# Patient Record
Sex: MALE | Race: WHITE | HISPANIC OR LATINO | URBAN - METROPOLITAN AREA
[De-identification: names, ages, dates, MRNs, and addresses within clinical notes are randomized per-mention and may not be internally consistent; named-entity substitution may affect disease eponyms.]

---

## 2018-06-20 ENCOUNTER — OFFICE VISIT (OUTPATIENT)
Dept: URBAN - METROPOLITAN AREA CLINIC 11 | Facility: CLINIC | Age: 78
End: 2018-06-20
Payer: COMMERCIAL

## 2018-06-20 DIAGNOSIS — H40.1133 PRIMARY OPEN-ANGLE GLAUCOMA, BILATERAL, SEVERE STAGE: Primary | ICD-10-CM

## 2018-06-20 PROCEDURE — 99214 OFFICE O/P EST MOD 30 MIN: CPT | Performed by: OPHTHALMOLOGY

## 2018-06-20 ASSESSMENT — INTRAOCULAR PRESSURE
OD: 15
OS: 17

## 2018-06-20 NOTE — IMPRESSION/PLAN
Impression: Age-related nuclear cataract, bilateral: H25.13. Plan: Cataracts account for the patient's complaints. No treatment currently recommended. The patient will monitor vision changes and contact us with any decrease in vision.  refraction PRN with Dr. Latham Dire

## 2018-06-20 NOTE — IMPRESSION/PLAN
Impression: Primary open-angle glaucoma, bilateral, severe stage: T09.1546. /578, 11/17 OCT [age] 6/6, 12/17 HVF OU Shemi -poorly reli Plan: IOP improved. Goal IOP mid teens OU. Recommend pt continue Travatan OS QHS and Simbrinza OU TID. Will continue to monitor IOP.

## 2018-08-13 ENCOUNTER — TESTING ONLY (OUTPATIENT)
Dept: URBAN - METROPOLITAN AREA CLINIC 11 | Facility: CLINIC | Age: 78
End: 2018-08-13

## 2018-08-13 DIAGNOSIS — H52.4 PRESBYOPIA: Primary | ICD-10-CM

## 2018-08-13 PROCEDURE — 92012 INTRM OPH EXAM EST PATIENT: CPT | Performed by: OPTOMETRIST

## 2018-08-13 ASSESSMENT — VISUAL ACUITY
OD: 20/40
OS: 20/50

## 2018-08-13 ASSESSMENT — INTRAOCULAR PRESSURE
OD: 21
OS: 28

## 2019-09-06 ENCOUNTER — OFFICE VISIT (OUTPATIENT)
Dept: URBAN - METROPOLITAN AREA CLINIC 11 | Facility: CLINIC | Age: 79
End: 2019-09-06
Payer: COMMERCIAL

## 2019-09-06 PROCEDURE — 92012 INTRM OPH EXAM EST PATIENT: CPT | Performed by: OPTOMETRIST

## 2019-09-17 ENCOUNTER — OFFICE VISIT (OUTPATIENT)
Dept: URBAN - METROPOLITAN AREA CLINIC 11 | Facility: CLINIC | Age: 79
End: 2019-09-17
Payer: COMMERCIAL

## 2019-09-17 DIAGNOSIS — H25.813 COMBINED FORMS OF AGE-RELATED CATARACT, BILATERAL: Primary | ICD-10-CM

## 2019-09-17 PROCEDURE — 92002 INTRM OPH EXAM NEW PATIENT: CPT | Performed by: OPHTHALMOLOGY

## 2019-09-17 PROCEDURE — 92012 INTRM OPH EXAM EST PATIENT: CPT | Performed by: OPHTHALMOLOGY

## 2019-09-17 PROCEDURE — 92020 GONIOSCOPY: CPT | Performed by: OPHTHALMOLOGY

## 2019-09-17 PROCEDURE — 92133 CPTRZD OPH DX IMG PST SGM ON: CPT | Performed by: OPHTHALMOLOGY

## 2019-09-17 RX ORDER — BRINZOLAMIDE/BRIMONIDINE TARTRATE 10; 2 MG/ML; MG/ML
SUSPENSION/ DROPS OPHTHALMIC
Qty: 1 | Refills: 6 | Status: ACTIVE
Start: 2019-09-17

## 2019-09-17 RX ORDER — TRAVOPROST 0.04 MG/ML
0.004 % SOLUTION/ DROPS OPHTHALMIC
Qty: 1 | Refills: 9 | Status: ACTIVE
Start: 2019-09-17

## 2019-09-17 ASSESSMENT — VISUAL ACUITY
OS: 20/60
OD: 20/60

## 2019-09-17 ASSESSMENT — KERATOMETRY
OS: 45.38
OD: 45.63

## 2019-09-17 ASSESSMENT — INTRAOCULAR PRESSURE
OS: 16
OD: 15

## 2019-09-17 NOTE — IMPRESSION/PLAN
Impression: Combined forms of age-related cataract, bilateral: H25.813. Plan: OK for ce/iol OD then OS (Omni OU) in [address], RL2. Distance target. Recommend Pred/Nep/Moxi and Pred/Nep combined drops. Discussed diagnosis of cataracts. Cataracts are limiting vision. Discussed risks, benefits and alternatives to surgery including but not limited to: bleeding, infection, risk of vision loss, loss of the eye, need for other surgery. Patient voiced understanding and wishes to proceed.

## 2019-09-17 NOTE — IMPRESSION/PLAN
Impression: Primary open-angle glaucoma, bilateral, severe stage: S98.3161. /578, 9/19 OCT [age] 6/5, 12/17 HVF OU Shemi -poorly reli
IOP up OS > OD  - no drops for months to a year
narrow angles OU Plan: Discussed diagnosis with patient. Discussed treatment options with patient. Recommend patient continue  Travatan QHS OU, and Simbrinza TID OU (medication sent to pharmacy)  Recommend Omni OU (if unable due to insurance coverage, second choice iStent). Discussed glaucoma and cataracts. Discussed risks, benefits and alternatives to MIGS procedure. Pt voiced understanding and desires to proceed.

## 2019-11-07 ENCOUNTER — PRE-OPERATIVE VISIT (OUTPATIENT)
Dept: URBAN - METROPOLITAN AREA CLINIC 11 | Facility: CLINIC | Age: 79
End: 2019-11-07
Payer: COMMERCIAL

## 2019-11-07 DIAGNOSIS — H25.13 AGE-RELATED NUCLEAR CATARACT, BILATERAL: Primary | ICD-10-CM

## 2019-11-07 PROCEDURE — 92136 OPHTHALMIC BIOMETRY: CPT | Performed by: OPHTHALMOLOGY

## 2019-11-07 ASSESSMENT — PACHYMETRY
OS: 2.91
OD: 23.24
OD: 2.79
OS: 23.37

## 2019-11-13 ENCOUNTER — SURGERY (OUTPATIENT)
Dept: URBAN - METROPOLITAN AREA SURGERY 20 | Facility: SURGERY | Age: 79
End: 2019-11-13
Payer: COMMERCIAL

## 2019-11-13 PROCEDURE — 66174 TRLUML DIL AQ O/F CAN W/O ST: CPT | Performed by: OPHTHALMOLOGY

## 2019-11-14 ENCOUNTER — POST-OPERATIVE VISIT (OUTPATIENT)
Dept: URBAN - METROPOLITAN AREA CLINIC 11 | Facility: CLINIC | Age: 79
End: 2019-11-14

## 2019-11-14 PROCEDURE — 99024 POSTOP FOLLOW-UP VISIT: CPT | Performed by: OPTOMETRIST

## 2019-11-14 RX ORDER — PREDNISOLONE ACETATE 10 MG/ML
1 % SUSPENSION/ DROPS OPHTHALMIC
Qty: 10 | Refills: 1 | Status: INACTIVE
Start: 2019-11-14 | End: 2019-12-12

## 2019-11-14 RX ORDER — OFLOXACIN 3 MG/ML
0.3 % SOLUTION/ DROPS OPHTHALMIC
Qty: 5 | Refills: 1 | Status: INACTIVE
Start: 2019-11-14 | End: 2019-12-12

## 2019-11-14 ASSESSMENT — INTRAOCULAR PRESSURE
OS: 14
OD: 14

## 2019-12-04 ENCOUNTER — POST-OPERATIVE VISIT (OUTPATIENT)
Dept: URBAN - METROPOLITAN AREA CLINIC 11 | Facility: CLINIC | Age: 79
End: 2019-12-04

## 2019-12-04 PROCEDURE — 99024 POSTOP FOLLOW-UP VISIT: CPT | Performed by: OPHTHALMOLOGY

## 2019-12-04 ASSESSMENT — VISUAL ACUITY
OD: 20/70
OS: 20/60

## 2019-12-04 ASSESSMENT — INTRAOCULAR PRESSURE
OD: 8
OS: 16

## 2019-12-11 ENCOUNTER — SURGERY (OUTPATIENT)
Dept: URBAN - METROPOLITAN AREA SURGERY 20 | Facility: SURGERY | Age: 79
End: 2019-12-11
Payer: COMMERCIAL

## 2019-12-11 PROCEDURE — 66174 TRLUML DIL AQ O/F CAN W/O ST: CPT | Performed by: OPHTHALMOLOGY

## 2019-12-12 ENCOUNTER — POST-OPERATIVE VISIT (OUTPATIENT)
Dept: URBAN - METROPOLITAN AREA CLINIC 11 | Facility: CLINIC | Age: 79
End: 2019-12-12

## 2019-12-12 PROCEDURE — 99024 POSTOP FOLLOW-UP VISIT: CPT | Performed by: OPTOMETRIST

## 2019-12-12 RX ORDER — OFLOXACIN 3 MG/ML
0.3 % SOLUTION/ DROPS OPHTHALMIC
Qty: 5 | Refills: 1 | Status: ACTIVE
Start: 2019-12-12

## 2019-12-12 RX ORDER — PREDNISOLONE ACETATE 10 MG/ML
1 % SUSPENSION/ DROPS OPHTHALMIC
Qty: 10 | Refills: 1 | Status: ACTIVE
Start: 2019-12-12

## 2019-12-12 ASSESSMENT — INTRAOCULAR PRESSURE
OD: 13
OS: 13

## 2019-12-20 ENCOUNTER — POST-OPERATIVE VISIT (OUTPATIENT)
Dept: URBAN - METROPOLITAN AREA CLINIC 11 | Facility: CLINIC | Age: 79
End: 2019-12-20

## 2019-12-20 PROCEDURE — 99024 POSTOP FOLLOW-UP VISIT: CPT | Performed by: OPTOMETRIST

## 2019-12-20 ASSESSMENT — INTRAOCULAR PRESSURE
OD: 12
OS: 13

## 2020-01-31 ENCOUNTER — POST-OPERATIVE VISIT (OUTPATIENT)
Dept: URBAN - METROPOLITAN AREA CLINIC 11 | Facility: CLINIC | Age: 80
End: 2020-01-31
Payer: MEDICARE

## 2020-01-31 DIAGNOSIS — Z09 ENCNTR FOR F/U EXAM AFT TRTMT FOR COND OTH THAN MALIG NEOPLM: Primary | ICD-10-CM

## 2020-01-31 PROCEDURE — 99024 POSTOP FOLLOW-UP VISIT: CPT | Performed by: OPTOMETRIST

## 2020-01-31 ASSESSMENT — INTRAOCULAR PRESSURE
OD: 13
OS: 14

## 2020-01-31 ASSESSMENT — VISUAL ACUITY
OD: 20/30+
OS: 20/30-

## 2023-02-07 NOTE — IMPRESSION/PLAN
Impression: Combined forms of age-related cataract, bilateral: H25.813. Plan: Discussed diagnosis in detail with patient. Discussed treatment options with patient.
Impression: Primary open-angle glaucoma, bilateral, severe stage: X85.1651. /578, 11/17 OCT [age] 6/6, 12/17 HVF OU Shemi -poorly reli
IOP up OS > OD  - no drops for months to a year
narrow angles OU Plan: Restart Travatan (sample of Lum[place] given) OU QHS and Simbrinza OU TID.  (sample of Alphagan P and Azopt given)
08-Feb-2023 02:17